# Patient Record
Sex: FEMALE | Race: WHITE | Employment: UNEMPLOYED | URBAN - METROPOLITAN AREA
[De-identification: names, ages, dates, MRNs, and addresses within clinical notes are randomized per-mention and may not be internally consistent; named-entity substitution may affect disease eponyms.]

---

## 2023-07-11 ENCOUNTER — OFFICE VISIT (OUTPATIENT)
Dept: PULMONOLOGY | Age: 68
End: 2023-07-11
Payer: MEDICARE

## 2023-07-11 VITALS
WEIGHT: 258 LBS | HEIGHT: 62 IN | OXYGEN SATURATION: 97 % | BODY MASS INDEX: 47.48 KG/M2 | HEART RATE: 88 BPM | SYSTOLIC BLOOD PRESSURE: 122 MMHG | DIASTOLIC BLOOD PRESSURE: 70 MMHG

## 2023-07-11 DIAGNOSIS — Z99.89 OSA ON CPAP: Primary | ICD-10-CM

## 2023-07-11 DIAGNOSIS — G47.33 OSA ON CPAP: Primary | ICD-10-CM

## 2023-07-11 PROCEDURE — 99213 OFFICE O/P EST LOW 20 MIN: CPT | Performed by: STUDENT IN AN ORGANIZED HEALTH CARE EDUCATION/TRAINING PROGRAM

## 2023-07-11 PROCEDURE — 1123F ACP DISCUSS/DSCN MKR DOCD: CPT | Performed by: STUDENT IN AN ORGANIZED HEALTH CARE EDUCATION/TRAINING PROGRAM

## 2023-07-11 ASSESSMENT — SLEEP AND FATIGUE QUESTIONNAIRES
HOW LIKELY ARE YOU TO NOD OFF OR FALL ASLEEP IN A CAR, WHILE STOPPED FOR A FEW MINUTES IN TRAFFIC: 1
ESS TOTAL SCORE: 10
HOW LIKELY ARE YOU TO NOD OFF OR FALL ASLEEP WHILE LYING DOWN TO REST IN THE AFTERNOON WHEN CIRCUMSTANCES PERMIT: 1
HOW LIKELY ARE YOU TO NOD OFF OR FALL ASLEEP WHILE WATCHING TV: 1
HOW LIKELY ARE YOU TO NOD OFF OR FALL ASLEEP WHILE SITTING QUIETLY AFTER LUNCH WITHOUT ALCOHOL: 1
HOW LIKELY ARE YOU TO NOD OFF OR FALL ASLEEP WHILE SITTING INACTIVE IN A PUBLIC PLACE: 1
HOW LIKELY ARE YOU TO NOD OFF OR FALL ASLEEP WHEN YOU ARE A PASSENGER IN A CAR FOR AN HOUR WITHOUT A BREAK: 3
HOW LIKELY ARE YOU TO NOD OFF OR FALL ASLEEP WHILE SITTING AND TALKING TO SOMEONE: 1
HOW LIKELY ARE YOU TO NOD OFF OR FALL ASLEEP WHILE SITTING AND READING: 1

## 2023-07-11 NOTE — PROGRESS NOTES
[address] [facility]  Sleep Medicine  [address] [facility], [address]  [place], [address] [facility]    Chief Complaint   Patient presents with    Follow-up         Jf Park comes in today for sleep apnea follow-up . She was diagnosed with severe obstructive sleep apnea and is being treated with PAP therapy. She has been on PAP therapy for about 20 years. At previous visit a new CPAP device was ordered. Today she states she wears the PAP device every night. She falls asleep in 10 minutes. She awakens 1 times per night and takes naps sometimes. Symptoms of sleep apnea have improved since using PAP therapy. She is not snoring with the machine on. She denies any complaints of too high pressure, hunger for air, dry mouth, mask fit / discomfort issues: , low air humidity, high air humidity, temperature issues: , and high/frequent leaks. DME: [facility]  Mask: F&P yossi full face mask  Machine: [employer] Aircurve 10 Auto      DATA REVIEWED TODAY:    Diagnostic Review  PSG on 3/19/2005 showed apnea hypopnea index of 89/h with oxygen desaturation down to a jessica of 76%. She is currently on auto Bilevel PAP. [place]       Sleep Medicine 7/11/2023 4/11/2023   Sitting and reading 1 3   Watching TV 1 3   Sitting, inactive in a public place (e.g. a theatre or a meeting) 1 0   As a passenger in a car for an hour without a break 3 3   Lying down to rest in the afternoon when circumstances permit 1 2   Sitting and talking to someone 1 0   Sitting quietly after a lunch without alcohol 1 0   In a car, while stopped for a few minutes in traffic 1 0   [place] Sleepiness Score 10 11   Neck circumference (Inches) - 17.5       PAP Adherence (dates: 6/10/2023 - 7/9/2023)  Mode: auto Bilevel PAP  Total days used: 26/30  % used days >= 4 hours: 87%  Average hours used per day: 7 hrs 47 mins  Leaks (95%): 1.1 L/min  Pressure setting:  IPAP max 20 cmH2O; EPAP min 8 cmH2O; PS 5 cmH2O   Average device pressure (95%):  IPAP 18 cmH2O;

## 2023-07-11 NOTE — PATIENT INSTRUCTIONS
ANUJ education:    You have obstructive sleep apnea (ANUJ):    1. Obstructive sleep apnea (ANUJ) is a condition where the upper airway narrows or closes intermittently during sleep. This can lead to drops in your oxygen levels during sleep, arousals from sleep, and excessive daytime sleepiness. 2. Untreated ANUJ is associated with increased risk of hypertension, cardiac disease, myocardial infarction, stroke, and poor blood sugar control. Treating your ANUJ can decrease these risks. 3. Weight loss does improve sleep apnea. It is important to have a healthy diet and an exercise program.     4. Alcohol and sedating medicine can make ANUJ worse and should be avoided in particular before sleep. 5. If you have surgery or are hospitalized, tell your doctor that you have ANUJ and bring your CPAP to the hospital.    6. If you are drowsy or sleepy, you should not drive. If you are driving and become drowsy or sleepy, you should pull over to a safe place. Below are our drowsy driving tips. PAP machine care: You should clean your PAP regularly (see your PAP  site for more details)  Daily cleaning   1. Wipe mask with a damp towel with mild detergent, rinse with a damp towel and let air dry. You can also see CPAP cleaning wipes. Avoid harsh cleaning wipes or chemicals. 2. Humidifier- empty water daily. Fill with clean distilled water before use before sleep. Weekly Cleaning   1. Clean mask, headgear, and tubing weekly  2. Fill your sink with warm water and a few drops of mild dish soap. Swirl equipment for at least 5 minutes. Rinse well and air dry. Hang hose over something so the water droplets drip out. 3. Clean filter- rinse with warm water, squeeze excess water and blot dry with towel. If there is a white filter (respironics machine)- do not wash that - it is disposable and should be changed once a month.    4. Humidifier- Disinfect in solution that is one part vinegar and 5 parts water for 30

## 2024-07-09 ENCOUNTER — OFFICE VISIT (OUTPATIENT)
Age: 69
End: 2024-07-09
Payer: MEDICARE

## 2024-07-09 VITALS
DIASTOLIC BLOOD PRESSURE: 77 MMHG | SYSTOLIC BLOOD PRESSURE: 125 MMHG | HEIGHT: 62 IN | HEART RATE: 75 BPM | OXYGEN SATURATION: 93 % | BODY MASS INDEX: 48.97 KG/M2 | WEIGHT: 266.1 LBS

## 2024-07-09 DIAGNOSIS — E66.01 MORBID OBESITY WITH BMI OF 45.0-49.9, ADULT (HCC): ICD-10-CM

## 2024-07-09 DIAGNOSIS — I10 HYPERTENSION, UNSPECIFIED TYPE: ICD-10-CM

## 2024-07-09 DIAGNOSIS — G47.33 OBSTRUCTIVE SLEEP APNEA TREATED WITH BIPAP: Primary | ICD-10-CM

## 2024-07-09 PROCEDURE — G2211 COMPLEX E/M VISIT ADD ON: HCPCS | Performed by: STUDENT IN AN ORGANIZED HEALTH CARE EDUCATION/TRAINING PROGRAM

## 2024-07-09 PROCEDURE — 3078F DIAST BP <80 MM HG: CPT | Performed by: STUDENT IN AN ORGANIZED HEALTH CARE EDUCATION/TRAINING PROGRAM

## 2024-07-09 PROCEDURE — 3074F SYST BP LT 130 MM HG: CPT | Performed by: STUDENT IN AN ORGANIZED HEALTH CARE EDUCATION/TRAINING PROGRAM

## 2024-07-09 PROCEDURE — 1123F ACP DISCUSS/DSCN MKR DOCD: CPT | Performed by: STUDENT IN AN ORGANIZED HEALTH CARE EDUCATION/TRAINING PROGRAM

## 2024-07-09 PROCEDURE — 99214 OFFICE O/P EST MOD 30 MIN: CPT | Performed by: STUDENT IN AN ORGANIZED HEALTH CARE EDUCATION/TRAINING PROGRAM

## 2024-07-09 ASSESSMENT — SLEEP AND FATIGUE QUESTIONNAIRES
HOW LIKELY ARE YOU TO NOD OFF OR FALL ASLEEP WHILE WATCHING TV: HIGH CHANCE OF DOZING
HOW LIKELY ARE YOU TO NOD OFF OR FALL ASLEEP WHILE SITTING INACTIVE IN A PUBLIC PLACE: HIGH CHANCE OF DOZING
ESS TOTAL SCORE: 12
HOW LIKELY ARE YOU TO NOD OFF OR FALL ASLEEP WHILE LYING DOWN TO REST IN THE AFTERNOON WHEN CIRCUMSTANCES PERMIT: SLIGHT CHANCE OF DOZING
HOW LIKELY ARE YOU TO NOD OFF OR FALL ASLEEP WHILE SITTING QUIETLY AFTER LUNCH WITHOUT ALCOHOL: MODERATE CHANCE OF DOZING
HOW LIKELY ARE YOU TO NOD OFF OR FALL ASLEEP WHILE SITTING AND READING: WOULD NEVER DOZE
HOW LIKELY ARE YOU TO NOD OFF OR FALL ASLEEP IN A CAR, WHILE STOPPED FOR A FEW MINUTES IN TRAFFIC: WOULD NEVER DOZE
HOW LIKELY ARE YOU TO NOD OFF OR FALL ASLEEP WHILE SITTING AND TALKING TO SOMEONE: WOULD NEVER DOZE
HOW LIKELY ARE YOU TO NOD OFF OR FALL ASLEEP WHEN YOU ARE A PASSENGER IN A CAR FOR AN HOUR WITHOUT A BREAK: HIGH CHANCE OF DOZING

## 2024-07-09 NOTE — PROGRESS NOTES
Diagnosis: [x] ANUJ  (G47.33) [] CSA (G47.31) []  Other:__________________   Length of Need: [] 13 months [x]  99 Months                                          []  Other:__________________   Machine (KENAN): [] Respironics Auto (with modem for remote monitoring)       [] Other:____________________    []  ResMed Auto (with modem for remote monitoring)    []  CPAP () [] Bilevel ()   Mode: Mode:   [] Auto [] Fixed [] Auto [] Spontaneous   Pmin:_________cmH2O      Pmax:_________cmH2O   P:_________cmH2O    EPAPmin:__________cmH2O IPAP:__________cmH2O     IPAPmax:__________cmH2O EPAP:__________cmH2O     PSmin:_______  PSmax:_______       (ResMed) PS:_________     Flex/EPR - 3 full time                          Ramp time: 30 min Flex/EPR - 3 full time                 Ramp time: 30 min   Ramp Pressure:___________cmH2O Ramp Pressure:____________cmH2O         Humidifier: [x] Heated ()                                               [] Passive     [x] Water chamber replacement ()/ 1 per 6 months   Mask:   [] Nasal () /1 per 3 months [x] Full Face () /1 per 3 months   [] Patient choice -Size and fit mask [x] Patient Choice - Size and fit mask   [] Dispense: nasal cushion  [] Dispense:  [x] Dispense: full face mask  [x] Dispense: Kassidy fullface mask   [] Headgear () / 1 per 3 months [x] Headgear () / 1 per 3 months   [] Replacement Nasal Cushion ()/2 per month [x] Interface Replacement ()/1 per month   [] Replacement Nasal Pillows ()/2 per month       Tubing: [x] Heated ()/1 per 3 months                           [] Standard ()/1 per 3 months  [] Other:____________________   Filters: [x] Non-disposable ()/1 per 6 months                 [x] Ultra-Fine, Disposable ()/2 per month     Miscellaneous: [x] Chin Strap as needed ()/ 1 per 6months      [] Other:__________________________________         Start Order Date: 07/09/24    MEDICAL JUSTIFICATION:  I,

## 2024-07-09 NOTE — PROGRESS NOTES
[facility]  Sleep Medicine  [address], [address]  [place] [address]    Chief Complaint   Patient presents with    Sleep Apnea         Kanwal Carlin comes in today for sleep apnea follow-up . She was diagnosed with severe obstructive sleep apnea and is being treated with PAP therapy.   She has been on PAP therapy for over 20 years.  She states she wears the PAP device most nights (hasn't been able to use it as much because she has been in the hospital to attend her  for the past several weeks).     She falls asleep in  10 minutes.  She awakens 1 times per night and takes no naps. The average total amount of sleep is about 8-9 hours per night. She does use sleep aid/s: trazodone 100 mg nightly. Symptoms of sleep apnea have improved since using PAP therapy. She is not snoring with the machine on.  She denies any complaints of too high pressure, hunger for air, dry mouth / nose, mask fit / discomfort issues, low air humidity, high air humidity, temperature issues, and high/frequent leaks.   DME: [facility]  Mask: F&P yossi full face mask  Machine: [employer] Aircurve 10 Auto      DATA REVIEWED TODAY:     Diagnostic Review  PSG on 3/19/2005 showed apnea hypopnea index of 89/h with oxygen desaturation down to a jessica of 76%.  She is currently on auto Bilevel PAP.    [place]           7/9/2024    11:11 AM 7/11/2023     1:05 PM 4/11/2023     3:19 PM   Sleep Medicine   Sitting and reading 0 1 3   Watching TV 3 1 3   Sitting, inactive in a public place (e.g. a theatre or a meeting) 3 1 0   As a passenger in a car for an hour without a break 3 3 3   Lying down to rest in the afternoon when circumstances permit 1 1 2   Sitting and talking to someone 0 1 0   Sitting quietly after a lunch without alcohol 2 1 0   In a car, while stopped for a few minutes in traffic 0 1 0   [place] Sleepiness Score 12 10 11   Neck circumference (Inches)   17.5       PAP Adherence (dates: 3/5/2024 -

## 2024-07-09 NOTE — PATIENT INSTRUCTIONS
effects of magnetic fields.  Note, not all models or variants of medical devices listed in the contraindications are affected by external magnetic fields. If you are not sure whether an implant or medical device falls under the contraindications, or you require additional information on the potential adverse effects of magnetic fields for your particular implant or medical device, please contact your physician/doctor.    Contact Information   Customers in the [place] with questions about this recall should contact [facility] at call [phone].

## 2025-02-04 ENCOUNTER — OFFICE VISIT (OUTPATIENT)
Age: 70
End: 2025-02-04
Payer: MEDICARE

## 2025-02-04 VITALS
HEART RATE: 92 BPM | HEIGHT: 62 IN | DIASTOLIC BLOOD PRESSURE: 82 MMHG | WEIGHT: 217.15 LBS | BODY MASS INDEX: 39.96 KG/M2 | SYSTOLIC BLOOD PRESSURE: 126 MMHG | TEMPERATURE: 97.8 F | OXYGEN SATURATION: 95 %

## 2025-02-04 DIAGNOSIS — G47.33 OBSTRUCTIVE SLEEP APNEA TREATED WITH BIPAP: Primary | ICD-10-CM

## 2025-02-04 DIAGNOSIS — I10 HYPERTENSION, UNSPECIFIED TYPE: ICD-10-CM

## 2025-02-04 DIAGNOSIS — E66.9 OBESITY (BMI 30-39.9): ICD-10-CM

## 2025-02-04 PROBLEM — E66.01 MORBID OBESITY WITH BMI OF 45.0-49.9, ADULT: Status: RESOLVED | Noted: 2024-07-09 | Resolved: 2025-02-04

## 2025-02-04 PROCEDURE — 3074F SYST BP LT 130 MM HG: CPT | Performed by: STUDENT IN AN ORGANIZED HEALTH CARE EDUCATION/TRAINING PROGRAM

## 2025-02-04 PROCEDURE — 1159F MED LIST DOCD IN RCRD: CPT | Performed by: STUDENT IN AN ORGANIZED HEALTH CARE EDUCATION/TRAINING PROGRAM

## 2025-02-04 PROCEDURE — 99214 OFFICE O/P EST MOD 30 MIN: CPT | Performed by: STUDENT IN AN ORGANIZED HEALTH CARE EDUCATION/TRAINING PROGRAM

## 2025-02-04 PROCEDURE — G2211 COMPLEX E/M VISIT ADD ON: HCPCS | Performed by: STUDENT IN AN ORGANIZED HEALTH CARE EDUCATION/TRAINING PROGRAM

## 2025-02-04 PROCEDURE — 3079F DIAST BP 80-89 MM HG: CPT | Performed by: STUDENT IN AN ORGANIZED HEALTH CARE EDUCATION/TRAINING PROGRAM

## 2025-02-04 PROCEDURE — 1123F ACP DISCUSS/DSCN MKR DOCD: CPT | Performed by: STUDENT IN AN ORGANIZED HEALTH CARE EDUCATION/TRAINING PROGRAM

## 2025-02-04 RX ORDER — TIRZEPATIDE 10 MG/.5ML
INJECTION, SOLUTION SUBCUTANEOUS
COMMUNITY
Start: 2024-11-15

## 2025-02-04 ASSESSMENT — SLEEP AND FATIGUE QUESTIONNAIRES
HOW LIKELY ARE YOU TO NOD OFF OR FALL ASLEEP IN A CAR, WHILE STOPPED FOR A FEW MINUTES IN TRAFFIC: WOULD NEVER DOZE
HOW LIKELY ARE YOU TO NOD OFF OR FALL ASLEEP WHILE SITTING QUIETLY AFTER LUNCH WITHOUT ALCOHOL: MODERATE CHANCE OF DOZING
HOW LIKELY ARE YOU TO NOD OFF OR FALL ASLEEP WHILE WATCHING TV: HIGH CHANCE OF DOZING
HOW LIKELY ARE YOU TO NOD OFF OR FALL ASLEEP WHEN YOU ARE A PASSENGER IN A CAR FOR AN HOUR WITHOUT A BREAK: MODERATE CHANCE OF DOZING
HOW LIKELY ARE YOU TO NOD OFF OR FALL ASLEEP WHILE LYING DOWN TO REST IN THE AFTERNOON WHEN CIRCUMSTANCES PERMIT: MODERATE CHANCE OF DOZING
ESS TOTAL SCORE: 12
HOW LIKELY ARE YOU TO NOD OFF OR FALL ASLEEP WHILE SITTING AND READING: MODERATE CHANCE OF DOZING
HOW LIKELY ARE YOU TO NOD OFF OR FALL ASLEEP WHILE SITTING INACTIVE IN A PUBLIC PLACE: SLIGHT CHANCE OF DOZING
HOW LIKELY ARE YOU TO NOD OFF OR FALL ASLEEP WHILE SITTING AND TALKING TO SOMEONE: WOULD NEVER DOZE

## 2025-02-04 NOTE — PROGRESS NOTES
Ordering Provider:   Amyea Luna MD - Diplomate, [facility] Sleep Medicine  [address] [place] [address], [place] [address]    Phone [phone]  Fax [phone]    Diagnosis: [x] ANUJ  (G47.33) [] CSA (G47.31) []  Other:__________________   Length of Need: [] 13 months [x]  99 Months                                          []  Other:__________________   Machine (KENAN): [] Respironics Auto (with modem for remote monitoring)       [] Other:____________________    []  ResMed Auto (with modem for remote monitoring)    []  CPAP () [] Bilevel ()   Mode: Mode:   [] Auto [] Fixed [] Auto [] Spontaneous   Pmin:_________cmH2O      Pmax:_________cmH2O   P:_________cmH2O    EPAPmin:__________cmH2O IPAP:__________cmH2O     IPAPmax:__________cmH2O EPAP:__________cmH2O     PSmin:_______  PSmax:_______       (ResMed) PS:_________     Flex/EPR - 3 full time                          Ramp time: 30 min Flex/EPR - 3 full time                 Ramp time: 30 min   Ramp Pressure:___________cmH2O Ramp Pressure:____________cmH2O         Humidifier: [x] Heated ()                                               [] Passive     [x] Water chamber replacement ()/ 1 per 6 months   Mask:   [x] Nasal () /1 per 3 months [] Full Face () /1 per 3 months   [x] Patient choice -Size and fit mask [] Patient Choice - Size and fit mask   [x] Dispense: nasal cushion  [] Dispense:  [] Dispense:    [x] Headgear () / 1 per 3 months [] Headgear () / 1 per 3 months   [x] Replacement Nasal Cushion ()/2 per month [] Interface Replacement ()/1 per month   [] Replacement Nasal Pillows ()/2 per month       Tubing: [x] Heated ()/1 per 3 months                           [] Standard ()/1 per 3 months  [] Other:____________________   Filters: [x] Non-disposable ()/1 per 6 months                 [x] Ultra-Fine, Disposable ()/2 per month     Miscellaneous: [x] Chin Strap

## 2025-02-04 NOTE — PROGRESS NOTES
[facility]  Sleep Medicine  [address], [address]  [place] [address]    Chief Complaint   Patient presents with    Sleep Apnea     Doing well with bipap         Kanwal Carlin comes in today for sleep apnea follow-up . She was diagnosed with severe obstructive sleep apnea and is being treated with PAP therapy.   She has been on PAP therapy for over 20 years.  She states she wears the PAP device most nights.     She falls asleep in  15 minutes.  She awakens 0 times per night. The average total amount of sleep is about 7-8 hours per night and takes no naps. She does use sleep aid/s: trazodone 100 mg nightly. Symptoms of sleep apnea have improved since using PAP therapy. She is not snoring with the machine on.  She denies any complaints of too high pressure, hunger for air, dry mouth / nose, mask fit / discomfort issues, low air humidity, high air humidity, temperature issues, and high/frequent leaks.   DME: [facility]  Mask: F&P yossi full face mask  Machine: [employer] Aircurve 10 Auto        DATA REVIEWED TODAY:     Diagnostic Review  PSG on 3/19/2005 showed apnea hypopnea index of 89/h with oxygen desaturation down to a jessica of 76%.  She is currently on auto Bilevel PAP.    [place]           2/4/2025     1:46 PM 7/9/2024    11:11 AM 7/11/2023     1:05 PM 4/11/2023     3:19 PM   Sleep Medicine   Sitting and reading 2 0 1 3   Watching TV 3 3 1 3   Sitting, inactive in a public place (e.g. a theatre or a meeting) 1 3 1 0   As a passenger in a car for an hour without a break 2 3 3 3   Lying down to rest in the afternoon when circumstances permit 2 1 1 2   Sitting and talking to someone 0 0 1 0   Sitting quietly after a lunch without alcohol 2 2 1 0   In a car, while stopped for a few minutes in traffic 0 0 1 0   [place] Sleepiness Score 12 12 10 11   Neck (Inches)    17.5       PAP Adherence (dates: 11/6/2024 - 2/3/2025)  Total days used: 72/90  % used days >= 4 hours: 76%  Average

## 2025-02-04 NOTE — PATIENT INSTRUCTIONS
ANUJ education:    You have sleep apnea:      Untreated sleep apnea is associated with increased risk of hypertension, cardiac disease, myocardial infarction, stroke, and poor blood sugar control. Treating your ANUJ can decrease these risks.    Weight loss does improve sleep apnea. It is important to have a healthy diet and an exercise program.     Alcohol and sedating medicine can make sleep apnea worse and should be avoided in particular before sleep.    If you have surgery or are hospitalized, tell your doctor that you have sleep apnea and bring your CPAP to the hospital.    If you are drowsy or sleepy, you should not drive. If you are driving and become drowsy or sleepy, you should pull over to a safe place. Below are our drowsy driving tips.     PAP machine care:   You should clean your PAP regularly (see your PAP  site for more details)  Daily cleaning   1. Wipe mask with a damp towel with mild detergent, rinse with a damp towel and let air dry. You can also see CPAP cleaning wipes. Avoid harsh cleaning wipes or chemicals.    2. Humidifier- empty water daily. Fill with clean distilled water before use before sleep.    Weekly Cleaning   1. Clean mask, headgear, and tubing weekly  2. Fill your sink with warm water and a few drops of mild dish soap. Swirl equipment for at least 5 minutes. Rinse well and air dry. Hang hose over something so the water droplets drip out.   3. Clean filter- rinse with warm water, squeeze excess water and blot dry with towel. If there is a white filter (respironics machine)- do not wash that - it is disposable and should be changed once a month.   4. Humidifier- Disinfect in solution that is one part vinegar and 5 parts water for 30 min. You can also put it in the top rack of  to clean.     Ask your medical equipment company about renewal of your supplies. At the very least- this should be done once a year.       Weight Loss  Weight Loss is an important part of